# Patient Record
Sex: FEMALE | URBAN - METROPOLITAN AREA
[De-identification: names, ages, dates, MRNs, and addresses within clinical notes are randomized per-mention and may not be internally consistent; named-entity substitution may affect disease eponyms.]

---

## 2021-12-10 ENCOUNTER — LAB OUTSIDE AN ENCOUNTER (OUTPATIENT)
Dept: URBAN - METROPOLITAN AREA CLINIC 33 | Facility: CLINIC | Age: 56
End: 2021-12-10

## 2021-12-10 ENCOUNTER — OFFICE VISIT (OUTPATIENT)
Dept: URBAN - METROPOLITAN AREA CLINIC 33 | Facility: CLINIC | Age: 56
End: 2021-12-10
Payer: COMMERCIAL

## 2021-12-10 ENCOUNTER — DASHBOARD ENCOUNTERS (OUTPATIENT)
Age: 56
End: 2021-12-10

## 2021-12-10 VITALS
RESPIRATION RATE: 99 BRPM | HEART RATE: 100 BPM | BODY MASS INDEX: 24.66 KG/M2 | SYSTOLIC BLOOD PRESSURE: 106 MMHG | HEIGHT: 65 IN | DIASTOLIC BLOOD PRESSURE: 74 MMHG | WEIGHT: 148 LBS

## 2021-12-10 DIAGNOSIS — Z12.11 COLON CANCER SCREENING: ICD-10-CM

## 2021-12-10 DIAGNOSIS — K29.70 GASTRITIS WITHOUT BLEEDING, UNSPECIFIED CHRONICITY, UNSPECIFIED GASTRITIS TYPE: ICD-10-CM

## 2021-12-10 PROCEDURE — 99203 OFFICE O/P NEW LOW 30 MIN: CPT | Performed by: INTERNAL MEDICINE

## 2021-12-10 RX ORDER — SODIUM, POTASSIUM,MAG SULFATES 17.5-3.13G
177ML SOLUTION, RECONSTITUTED, ORAL ORAL
Qty: 354 MILLILITER | Refills: 0 | OUTPATIENT
Start: 2021-12-10 | End: 2021-12-12

## 2021-12-10 RX ORDER — LORATADINE 10 MG
1 TABLET TABLET ORAL ONCE A DAY
Status: ACTIVE | COMMUNITY

## 2021-12-10 RX ORDER — ZOLPIDEM TARTRATE 5 MG/1
1 TABLET AT BEDTIME TABLET, FILM COATED ORAL ONCE A DAY
Status: ACTIVE | COMMUNITY

## 2021-12-10 NOTE — PHYSICAL EXAM GASTROINTESTINAL
Abdomen - soft, nontender, nondistended, no guarding or rigidity, no masses palpable, normal bowel sounds. Liver and Spleen - no hepatosplenomegaly, liver nontender

## 2021-12-10 NOTE — PHYSICAL EXAM NECK/THYROID:
Normal appearance, without tenderness upon palpation, no deformities, trachea midline, no thyroid nodules, no masses

## 2021-12-10 NOTE — PHYSICAL EXAM CHEST:
No lesions,  no deformities,  no traumatic injuries,  no significant scars are present,  chest wall non-tender,  no masses present, breathing is unlabored without accessory muscle use, normal breath sounds

## 2021-12-10 NOTE — PHYSICAL EXAM HENT:
Head - normocephalic,  atraumatic. Face - face within normal limits. Mouth and Throat - oral cavity appearance normal. Lips - appearance normal

## 2021-12-10 NOTE — HPI-TODAY'S VISIT:
57 y/o female patient presents today for a consultation for a colorectal cancer screening and EGD. Patient admits this is her first colonoscopy due to age. Patient currently admits 1 bowel movements every 3 days with no melena, mucus  or blood in stools. Patient denies abdominal pain, bloating or gas. Patient admits heartburn.  Patient denies having a colonoscopy.  Patient admits having EGD in [place] in 2017. Findings revealed H.pylori and mild gastritis. Patient denies a family hx of colon, gastric, or esophageal cancer/polyps.

## 2021-12-10 NOTE — PHYSICAL EXAM CONSTITUTIONAL:
Well developed, well nourished, in no acute distress, ambulating without difficulty, normal communication ability

## 2021-12-16 PROBLEM — 4556007: Status: ACTIVE | Noted: 2021-12-10

## 2021-12-17 ENCOUNTER — LAB OUTSIDE AN ENCOUNTER (OUTPATIENT)
Dept: URBAN - METROPOLITAN AREA CLINIC 33 | Facility: CLINIC | Age: 56
End: 2021-12-17

## 2021-12-27 ENCOUNTER — TELEPHONE ENCOUNTER (OUTPATIENT)
Dept: URBAN - METROPOLITAN AREA CLINIC 36 | Facility: CLINIC | Age: 56
End: 2021-12-27

## 2021-12-29 ENCOUNTER — OFFICE VISIT (OUTPATIENT)
Dept: URBAN - METROPOLITAN AREA MEDICAL CENTER 10 | Facility: MEDICAL CENTER | Age: 56
End: 2021-12-29
Payer: COMMERCIAL

## 2021-12-29 DIAGNOSIS — B96.81 BACTERIAL INFECTION DUE TO H. PYLORI: ICD-10-CM

## 2021-12-29 DIAGNOSIS — K63.5 BENIGN COLON POLYP: ICD-10-CM

## 2021-12-29 DIAGNOSIS — K29.60 ADENOPAPILLOMATOSIS GASTRICA: ICD-10-CM

## 2021-12-29 DIAGNOSIS — K63.89 BACTERIAL OVERGROWTH SYNDROME: ICD-10-CM

## 2021-12-29 PROCEDURE — 43239 EGD BIOPSY SINGLE/MULTIPLE: CPT | Performed by: INTERNAL MEDICINE

## 2021-12-29 PROCEDURE — 45380 COLONOSCOPY AND BIOPSY: CPT | Performed by: INTERNAL MEDICINE

## 2022-01-21 ENCOUNTER — TELEPHONE ENCOUNTER (OUTPATIENT)
Dept: URBAN - METROPOLITAN AREA CLINIC 36 | Facility: CLINIC | Age: 57
End: 2022-01-21

## 2022-01-21 ENCOUNTER — OFFICE VISIT (OUTPATIENT)
Dept: URBAN - METROPOLITAN AREA CLINIC 33 | Facility: CLINIC | Age: 57
End: 2022-01-21

## 2022-01-21 RX ORDER — LORATADINE 10 MG
1 TABLET TABLET ORAL ONCE A DAY
Status: ACTIVE | COMMUNITY

## 2022-01-21 RX ORDER — OMEPRAZOLE 40 MG/1
1 CAPSULE 30 MINUTES BEFORE MORNING MEAL CAPSULE, DELAYED RELEASE ORAL TWICE A DAY
Qty: 28 | Refills: 0 | OUTPATIENT
Start: 2022-01-21

## 2022-01-21 RX ORDER — OMEPRAZOLE 40 MG/1
1 CAPSULE 30 MINUTES BEFORE MORNING MEAL CAPSULE, DELAYED RELEASE ORAL ONCE A DAY
Qty: 30 | Refills: 6 | OUTPATIENT
Start: 2022-01-21

## 2022-01-21 RX ORDER — CLARITHROMYCIN 500 MG/1
1 TABLET TABLET, FILM COATED ORAL
Qty: 28 TABLET | Refills: 0 | OUTPATIENT
Start: 2022-01-21 | End: 2022-02-04

## 2022-01-21 RX ORDER — ZOLPIDEM TARTRATE 5 MG/1
1 TABLET AT BEDTIME TABLET, FILM COATED ORAL ONCE A DAY
Status: ACTIVE | COMMUNITY

## 2022-01-21 RX ORDER — AMOXICILLIN 500 MG/1
2 CAPSULES CAPSULE ORAL TWICE A DAY
Qty: 56 CAPSULE | Refills: 0 | OUTPATIENT
Start: 2022-01-21 | End: 2022-02-04

## 2022-01-21 NOTE — HPI-TODAY'S VISIT:
55 y/o female patient presents today for follow up to her EGD and colonoscopy, which was completed on (12/29/2022) by Dr. Praveen Palacios. Patient admits/denies any complications after her procedure. Patient currently admits __ formed bowel movements per day. Patient denies any melena, blood or mucus in stools. Patient denies any associated abdominal pain, heartburn, bloating, or gas. Patient denies dysphagia, globus, changes in appetite, or changes in bowel habits.    Colonoscopy report shows: -Preparation of the colon was fair. -Two 3 mm polyps in the proximal sigmoid colon, removed with a cold biopsy forceps. Resected and retrieved. -Diverticulosis in the proximal ascending colon and in the cecum. -The examination was otherwise normal on direct and retroflexion views.   EGD report shows: -LA Grade A reflux esophagitis. Biopsied.  -Gastritis. Biopsied. -Normal examined duodenum.               Last Visit: (12/10/2021)  55 y/o female patient presents today for a consultation for a colorectal cancer screening and EGD. Patient admits this is her first colonoscopy due to age. Patient currently admits 1 bowel movements every 3 days with no melena, mucus  or blood in stools. Patient denies abdominal pain, bloating or gas. Patient admits heartburn.  Patient denies having a colonoscopy.  Patient admits having EGD in [place] in 2017. Findings revealed H.pylori and mild gastritis. Patient denies a family hx of colon, gastric, or esophageal cancer/polyps.